# Patient Record
Sex: FEMALE | Race: BLACK OR AFRICAN AMERICAN | NOT HISPANIC OR LATINO
[De-identification: names, ages, dates, MRNs, and addresses within clinical notes are randomized per-mention and may not be internally consistent; named-entity substitution may affect disease eponyms.]

---

## 2019-03-22 ENCOUNTER — APPOINTMENT (OUTPATIENT)
Dept: OBGYN | Facility: CLINIC | Age: 49
End: 2019-03-22
Payer: COMMERCIAL

## 2019-03-22 VITALS
DIASTOLIC BLOOD PRESSURE: 80 MMHG | SYSTOLIC BLOOD PRESSURE: 130 MMHG | HEIGHT: 65 IN | WEIGHT: 229 LBS | BODY MASS INDEX: 38.15 KG/M2

## 2019-03-22 DIAGNOSIS — Z86.19 PERSONAL HISTORY OF OTHER INFECTIOUS AND PARASITIC DISEASES: ICD-10-CM

## 2019-03-22 DIAGNOSIS — Z87.59 PERSONAL HISTORY OF OTHER COMPLICATIONS OF PREGNANCY, CHILDBIRTH AND THE PUERPERIUM: ICD-10-CM

## 2019-03-22 DIAGNOSIS — Z82.49 FAMILY HISTORY OF ISCHEMIC HEART DISEASE AND OTHER DISEASES OF THE CIRCULATORY SYSTEM: ICD-10-CM

## 2019-03-22 DIAGNOSIS — Z81.8 FAMILY HISTORY OF OTHER MENTAL AND BEHAVIORAL DISORDERS: ICD-10-CM

## 2019-03-22 DIAGNOSIS — Z86.79 PERSONAL HISTORY OF OTHER DISEASES OF THE CIRCULATORY SYSTEM: ICD-10-CM

## 2019-03-22 PROCEDURE — 99201 OFFICE OUTPATIENT NEW 10 MINUTES: CPT

## 2019-03-22 PROCEDURE — 36415 COLL VENOUS BLD VENIPUNCTURE: CPT

## 2019-03-22 NOTE — HISTORY OF PRESENT ILLNESS
[Definite:  ___ (Date)] : the last menstrual period was [unfilled] [Normal Amount/Duration] : was of a normal amount and duration [Contraception] : uses contraception [IUD] : has an intrauterine device [Spotting Between  Menses] : no spotting between menses [Regular Cycle Intervals] : periods have been irregular [de-identified] : Paragard

## 2019-03-23 LAB — FSH SERPL-MCNC: 73.9 IU/L

## 2019-03-26 ENCOUNTER — APPOINTMENT (OUTPATIENT)
Dept: OBGYN | Facility: CLINIC | Age: 49
End: 2019-03-26
Payer: COMMERCIAL

## 2019-03-26 DIAGNOSIS — Z12.4 ENCOUNTER FOR SCREENING FOR MALIGNANT NEOPLASM OF CERVIX: ICD-10-CM

## 2019-03-26 DIAGNOSIS — Z30.432 ENCOUNTER FOR REMOVAL OF INTRAUTERINE CONTRACEPTIVE DEVICE: ICD-10-CM

## 2019-03-26 PROCEDURE — 99213 OFFICE O/P EST LOW 20 MIN: CPT | Mod: 25

## 2019-03-26 PROCEDURE — 58301 REMOVE INTRAUTERINE DEVICE: CPT

## 2019-03-26 NOTE — CHIEF COMPLAINT
[Follow Up] : follow up GYN visit [FreeTextEntry1] : Patient is here for an removal of a Paragard 10 year IUD and repeat Pap.

## 2019-03-30 LAB — CYTOLOGY CVX/VAG DOC THIN PREP: NORMAL

## 2021-03-23 ENCOUNTER — APPOINTMENT (OUTPATIENT)
Dept: OBGYN | Facility: CLINIC | Age: 51
End: 2021-03-23
Payer: COMMERCIAL

## 2021-03-23 VITALS
BODY MASS INDEX: 40.81 KG/M2 | DIASTOLIC BLOOD PRESSURE: 90 MMHG | SYSTOLIC BLOOD PRESSURE: 140 MMHG | WEIGHT: 245.25 LBS

## 2021-03-23 DIAGNOSIS — N95.1 MENOPAUSAL AND FEMALE CLIMACTERIC STATES: ICD-10-CM

## 2021-03-23 PROCEDURE — 99072 ADDL SUPL MATRL&STAF TM PHE: CPT

## 2021-03-23 PROCEDURE — 99396 PREV VISIT EST AGE 40-64: CPT

## 2021-03-24 PROBLEM — N95.1 MENOPAUSE SYNDROME: Status: ACTIVE | Noted: 2021-03-24

## 2021-03-24 NOTE — PLAN
[FreeTextEntry1] : Discussed treatment options including HRT and Brisdelle, patient declines. Interested in natural options. Encouraged weight loss, limiting caffeine and alcohol. All questions answered.

## 2021-03-25 LAB — HPV HIGH+LOW RISK DNA PNL CVX: NOT DETECTED

## 2021-03-26 LAB — CYTOLOGY CVX/VAG DOC THIN PREP: NORMAL

## 2021-07-12 ENCOUNTER — APPOINTMENT (OUTPATIENT)
Dept: BREAST CENTER | Facility: CLINIC | Age: 51
End: 2021-07-12
Payer: COMMERCIAL

## 2021-07-12 VITALS
SYSTOLIC BLOOD PRESSURE: 125 MMHG | HEART RATE: 80 BPM | HEIGHT: 65 IN | DIASTOLIC BLOOD PRESSURE: 82 MMHG | BODY MASS INDEX: 40.15 KG/M2 | WEIGHT: 241 LBS

## 2021-07-12 DIAGNOSIS — N64.52 NIPPLE DISCHARGE: ICD-10-CM

## 2021-07-12 DIAGNOSIS — N95.1 MENOPAUSAL AND FEMALE CLIMACTERIC STATES: ICD-10-CM

## 2021-07-12 PROCEDURE — 99243 OFF/OP CNSLTJ NEW/EST LOW 30: CPT

## 2021-07-12 RX ORDER — COPPER 313.4 MG/1
INTRAUTERINE DEVICE INTRAUTERINE
Refills: 0 | Status: DISCONTINUED | COMMUNITY
End: 2021-07-12

## 2022-06-10 ENCOUNTER — EMERGENCY (EMERGENCY)
Facility: HOSPITAL | Age: 52
LOS: 1 days | Discharge: HOME | End: 2022-06-10
Admitting: EMERGENCY MEDICINE
Payer: COMMERCIAL

## 2022-06-10 VITALS
DIASTOLIC BLOOD PRESSURE: 85 MMHG | TEMPERATURE: 98 F | RESPIRATION RATE: 16 BRPM | SYSTOLIC BLOOD PRESSURE: 127 MMHG | OXYGEN SATURATION: 98 % | HEART RATE: 72 BPM | HEIGHT: 65 IN | WEIGHT: 242.07 LBS

## 2022-06-10 VITALS
OXYGEN SATURATION: 97 % | SYSTOLIC BLOOD PRESSURE: 128 MMHG | HEART RATE: 82 BPM | DIASTOLIC BLOOD PRESSURE: 88 MMHG | TEMPERATURE: 98 F | RESPIRATION RATE: 18 BRPM

## 2022-06-10 DIAGNOSIS — R07.9 CHEST PAIN, UNSPECIFIED: ICD-10-CM

## 2022-06-10 DIAGNOSIS — Z20.822 CONTACT WITH AND (SUSPECTED) EXPOSURE TO COVID-19: ICD-10-CM

## 2022-06-10 DIAGNOSIS — H57.89 OTHER SPECIFIED DISORDERS OF EYE AND ADNEXA: ICD-10-CM

## 2022-06-10 LAB
ALBUMIN SERPL ELPH-MCNC: 3.2 G/DL — LOW (ref 3.4–5)
ALP SERPL-CCNC: 97 U/L — SIGNIFICANT CHANGE UP (ref 40–120)
ALT FLD-CCNC: 19 U/L — SIGNIFICANT CHANGE UP (ref 12–42)
ANION GAP SERPL CALC-SCNC: 8 MMOL/L — LOW (ref 9–16)
APPEARANCE UR: CLEAR — SIGNIFICANT CHANGE UP
APTT BLD: 32.3 SEC — SIGNIFICANT CHANGE UP (ref 27.5–35.5)
AST SERPL-CCNC: 17 U/L — SIGNIFICANT CHANGE UP (ref 15–37)
BASOPHILS # BLD AUTO: 0.02 K/UL — SIGNIFICANT CHANGE UP (ref 0–0.2)
BASOPHILS NFR BLD AUTO: 0.3 % — SIGNIFICANT CHANGE UP (ref 0–2)
BILIRUB SERPL-MCNC: 0.5 MG/DL — SIGNIFICANT CHANGE UP (ref 0.2–1.2)
BILIRUB UR-MCNC: NEGATIVE — SIGNIFICANT CHANGE UP
BUN SERPL-MCNC: 17 MG/DL — SIGNIFICANT CHANGE UP (ref 7–23)
CALCIUM SERPL-MCNC: 8.8 MG/DL — SIGNIFICANT CHANGE UP (ref 8.5–10.5)
CHLORIDE SERPL-SCNC: 107 MMOL/L — SIGNIFICANT CHANGE UP (ref 96–108)
CO2 SERPL-SCNC: 27 MMOL/L — SIGNIFICANT CHANGE UP (ref 22–31)
COLOR SPEC: YELLOW — SIGNIFICANT CHANGE UP
CREAT SERPL-MCNC: 0.84 MG/DL — SIGNIFICANT CHANGE UP (ref 0.5–1.3)
DIFF PNL FLD: NEGATIVE — SIGNIFICANT CHANGE UP
EGFR: 84 ML/MIN/1.73M2 — SIGNIFICANT CHANGE UP
EOSINOPHIL # BLD AUTO: 0.23 K/UL — SIGNIFICANT CHANGE UP (ref 0–0.5)
EOSINOPHIL NFR BLD AUTO: 3 % — SIGNIFICANT CHANGE UP (ref 0–6)
GLUCOSE SERPL-MCNC: 95 MG/DL — SIGNIFICANT CHANGE UP (ref 70–99)
GLUCOSE UR QL: NEGATIVE — SIGNIFICANT CHANGE UP
HCG SERPL-ACNC: 1 MIU/ML — SIGNIFICANT CHANGE UP
HCT VFR BLD CALC: 38.2 % — SIGNIFICANT CHANGE UP (ref 34.5–45)
HGB BLD-MCNC: 12.3 G/DL — SIGNIFICANT CHANGE UP (ref 11.5–15.5)
IMM GRANULOCYTES NFR BLD AUTO: 0.4 % — SIGNIFICANT CHANGE UP (ref 0–1.5)
INR BLD: 1.07 — SIGNIFICANT CHANGE UP (ref 0.88–1.16)
KETONES UR-MCNC: NEGATIVE — SIGNIFICANT CHANGE UP
LACTATE SERPL-SCNC: 1.2 MMOL/L — SIGNIFICANT CHANGE UP (ref 0.4–2)
LEUKOCYTE ESTERASE UR-ACNC: NEGATIVE — SIGNIFICANT CHANGE UP
LIDOCAIN IGE QN: 120 U/L — SIGNIFICANT CHANGE UP (ref 73–393)
LYMPHOCYTES # BLD AUTO: 2.6 K/UL — SIGNIFICANT CHANGE UP (ref 1–3.3)
LYMPHOCYTES # BLD AUTO: 33.9 % — SIGNIFICANT CHANGE UP (ref 13–44)
MAGNESIUM SERPL-MCNC: 2.1 MG/DL — SIGNIFICANT CHANGE UP (ref 1.6–2.6)
MCHC RBC-ENTMCNC: 26.9 PG — LOW (ref 27–34)
MCHC RBC-ENTMCNC: 32.2 GM/DL — SIGNIFICANT CHANGE UP (ref 32–36)
MCV RBC AUTO: 83.6 FL — SIGNIFICANT CHANGE UP (ref 80–100)
MONOCYTES # BLD AUTO: 0.73 K/UL — SIGNIFICANT CHANGE UP (ref 0–0.9)
MONOCYTES NFR BLD AUTO: 9.5 % — SIGNIFICANT CHANGE UP (ref 2–14)
NEUTROPHILS # BLD AUTO: 4.05 K/UL — SIGNIFICANT CHANGE UP (ref 1.8–7.4)
NEUTROPHILS NFR BLD AUTO: 52.9 % — SIGNIFICANT CHANGE UP (ref 43–77)
NITRITE UR-MCNC: NEGATIVE — SIGNIFICANT CHANGE UP
NRBC # BLD: 0 /100 WBCS — SIGNIFICANT CHANGE UP (ref 0–0)
NT-PROBNP SERPL-SCNC: 44 PG/ML — SIGNIFICANT CHANGE UP
PH UR: 7 — SIGNIFICANT CHANGE UP (ref 5–8)
PLATELET # BLD AUTO: 225 K/UL — SIGNIFICANT CHANGE UP (ref 150–400)
POTASSIUM SERPL-MCNC: 3.7 MMOL/L — SIGNIFICANT CHANGE UP (ref 3.5–5.3)
POTASSIUM SERPL-SCNC: 3.7 MMOL/L — SIGNIFICANT CHANGE UP (ref 3.5–5.3)
PROT SERPL-MCNC: 7 G/DL — SIGNIFICANT CHANGE UP (ref 6.4–8.2)
PROT UR-MCNC: NEGATIVE MG/DL — SIGNIFICANT CHANGE UP
PROTHROM AB SERPL-ACNC: 12.5 SEC — SIGNIFICANT CHANGE UP (ref 10.5–13.4)
RBC # BLD: 4.57 M/UL — SIGNIFICANT CHANGE UP (ref 3.8–5.2)
RBC # FLD: 13.2 % — SIGNIFICANT CHANGE UP (ref 10.3–14.5)
SARS-COV-2 RNA SPEC QL NAA+PROBE: SIGNIFICANT CHANGE UP
SODIUM SERPL-SCNC: 142 MMOL/L — SIGNIFICANT CHANGE UP (ref 132–145)
SP GR SPEC: 1.01 — SIGNIFICANT CHANGE UP (ref 1–1.03)
TROPONIN I, HIGH SENSITIVITY RESULT: 5.5 NG/L — SIGNIFICANT CHANGE UP
UROBILINOGEN FLD QL: 0.2 E.U./DL — SIGNIFICANT CHANGE UP
WBC # BLD: 7.66 K/UL — SIGNIFICANT CHANGE UP (ref 3.8–10.5)
WBC # FLD AUTO: 7.66 K/UL — SIGNIFICANT CHANGE UP (ref 3.8–10.5)

## 2022-06-10 PROCEDURE — 71045 X-RAY EXAM CHEST 1 VIEW: CPT | Mod: 26

## 2022-06-10 PROCEDURE — 99285 EMERGENCY DEPT VISIT HI MDM: CPT | Mod: 25

## 2022-06-10 PROCEDURE — 93010 ELECTROCARDIOGRAM REPORT: CPT

## 2022-06-10 RX ORDER — ERYTHROMYCIN BASE 5 MG/GRAM
1 OINTMENT (GRAM) OPHTHALMIC (EYE)
Qty: 1 | Refills: 0
Start: 2022-06-10 | End: 2022-06-16

## 2022-06-10 NOTE — ED PROVIDER NOTE - PATIENT PORTAL LINK FT
You can access the FollowMyHealth Patient Portal offered by [facility] by registering at the following website: [email or web address]. By joining [employer]’s FollowMyHealth portal, you will also be able to view your health information using other applications (apps) compatible with our system.

## 2022-06-10 NOTE — ED PROVIDER NOTE - NSFOLLOWUPINSTRUCTIONS_ED_ALL_ED_FT
Follow up with your primary care doctor or clinics listed below if you do not have a doctor  Return immediately for any new or worsening symptoms or any new concerns.  Chest Pain    Chest pain can be caused by many different conditions which may or may not be dangerous. Causes include heartburn, lung infections, heart attack, blood clot in lungs, skin infections, strain or damage to muscle, cartilage, or bones, etc. In addition to a history and physical examination, an electrocardiogram (ECG) or other lab tests may have been performed to determine the cause of your chest pain. Follow up with your primary care provider or with a cardiologist as instructed.     SEEK IMMEDIATE MEDICAL CARE IF YOU HAVE ANY OF THE FOLLOWING SYMPTOMS: worsening chest pain, coughing up blood, unexplained back/neck/jaw pain, severe abdominal pain, dizziness or lightheadedness, fainting, shortness of breath, sweaty or clammy skin, vomiting, or racing heart beat. These symptoms may represent a serious problem that is an emergency. Do not wait to see if the symptoms will go away. Get medical help right away. Call 911 and do not drive yourself to the hospital.

## 2022-06-10 NOTE — ED PROVIDER NOTE - CLINICAL SUMMARY MEDICAL DECISION MAKING FREE TEXT BOX
52 y/o female with no PMHx presenting with intermittent chest pain since yesterday and left eye injection with discharge. On examination there was left eye injection with some discharge. Will do ACS workup and treat for conjunctivitis.

## 2022-06-10 NOTE — ED PROVIDER NOTE - OBJECTIVE STATEMENT
50 y/o female with no PMHx, presenting with intermittent chest pain since yesterday. Patient also complains of left eye injection with some discharge. Patient is concerned for conjunctivitis. Pt denies fevers/chills, headache, SOB, abdominal pain, N/V/D, weakness, and numbness.

## 2022-06-10 NOTE — ED PROVIDER NOTE - CHPI ED SYMPTOMS NEG
no weakness, no numbness, no abdominal pain, no diarrhea, no headache/no fever/no nausea/no shortness of breath/no vomiting/no chills

## 2022-06-10 NOTE — ED ADULT NURSE NOTE - NSIMPLEMENTINTERV_GEN_ALL_ED
Implemented All Universal Safety Interventions:  [place] to call system. Call bell, personal items and telephone within reach. Instruct patient to call for assistance. Room bathroom lighting operational. Non-slip footwear when patient is off stretcher. Physically safe environment: no spills, clutter or unnecessary equipment. Stretcher in lowest position, wheels locked, appropriate side rails in place.

## 2022-06-10 NOTE — ED ADULT NURSE NOTE - OBJECTIVE STATEMENT
Pt presents to ED c/o intermittent chest pain x 2 days. On arrival pt denies any active chest pain, shortness of breath, dizziness or palpitations

## 2022-06-12 LAB
CULTURE RESULTS: SIGNIFICANT CHANGE UP
SPECIMEN SOURCE: SIGNIFICANT CHANGE UP

## 2022-07-14 ENCOUNTER — NON-APPOINTMENT (OUTPATIENT)
Age: 52
End: 2022-07-14

## 2022-07-14 ENCOUNTER — APPOINTMENT (OUTPATIENT)
Dept: BREAST CENTER | Facility: CLINIC | Age: 52
End: 2022-07-14

## 2022-07-14 VITALS
HEIGHT: 65 IN | DIASTOLIC BLOOD PRESSURE: 86 MMHG | HEART RATE: 91 BPM | WEIGHT: 253 LBS | SYSTOLIC BLOOD PRESSURE: 141 MMHG | BODY MASS INDEX: 42.15 KG/M2

## 2022-07-14 DIAGNOSIS — N64.4 MASTODYNIA: ICD-10-CM

## 2022-07-14 PROCEDURE — 99213 OFFICE O/P EST LOW 20 MIN: CPT

## 2022-07-15 PROBLEM — Z78.9 OTHER SPECIFIED HEALTH STATUS: Chronic | Status: ACTIVE | Noted: 2022-06-10

## 2022-11-16 ENCOUNTER — EMERGENCY (EMERGENCY)
Facility: HOSPITAL | Age: 52
LOS: 1 days | Discharge: HOME | End: 2022-11-16
Admitting: EMERGENCY MEDICINE
Payer: SELF-PAY

## 2022-11-16 VITALS
HEART RATE: 93 BPM | DIASTOLIC BLOOD PRESSURE: 82 MMHG | TEMPERATURE: 98 F | WEIGHT: 240.08 LBS | HEIGHT: 65 IN | OXYGEN SATURATION: 98 % | SYSTOLIC BLOOD PRESSURE: 140 MMHG | RESPIRATION RATE: 18 BRPM

## 2022-11-16 PROCEDURE — 99283 EMERGENCY DEPT VISIT LOW MDM: CPT

## 2022-11-16 RX ORDER — IBUPROFEN 200 MG
600 TABLET ORAL ONCE
Refills: 0 | Status: COMPLETED | OUTPATIENT
Start: 2022-11-16 | End: 2022-11-16

## 2022-11-16 RX ADMIN — Medication 600 MILLIGRAM(S): at 17:03

## 2022-11-16 NOTE — ED ADULT TRIAGE NOTE - CHIEF COMPLAINT QUOTE
Pt s/p meniscus tear repair Nov. 10th. States "Yesterday I noticed the stiches started to come loose". Denies fevers, chills

## 2022-11-16 NOTE — ED PROVIDER NOTE - NSFOLLOWUPINSTRUCTIONS_ED_ALL_ED_FT
Follow up with Dr. Pinto tomorrow in the [place] office. [phone] [address], [place] [address]    REturn to ED for severe pain, fever, chills, swelling, redness or drainage

## 2022-11-16 NOTE — ED PROVIDER NOTE - PATIENT PORTAL LINK FT
You can access the FollowMyHealth Patient Portal offered by [facility] by registering at the following website: [email or web address]. By joining [employer]’s FollowMyHealth portal, you will also be able to view your health information using other applications (apps) compatible with our system.

## 2022-11-16 NOTE — ED PROVIDER NOTE - CLINICAL SUMMARY MEDICAL DECISION MAKING FREE TEXT BOX
50 yo F with no pmh c/o problems with her sutures to her R knee. Pt had a meniscus surgery on 11/10 in [place]. Yesterday she noticed the suture was stuck to the bandage and thought maybe it got pulled out. Reports  her knee feels warm. Denies fever, chills, numbness, tingling, redness, worsening swelling. Pt taking motrin for pain which helps. Pt tried to call her surgeon today and then nurse told her to go to the ED.  Afebrile. R knee- mild warmth, no erythema, mild swelling, sutures in place, no drainage. Spoke with pts surgeon Dr. Pinto who states it is not uncommon for mild warmth post op and would not recommend abx without testing the fluid. He can see her in his [place] office tomorrow.

## 2022-11-16 NOTE — ED PROVIDER NOTE - OBJECTIVE STATEMENT
50 yo F with no pmh c/o problems with her sutures to her R knee. Pt had a meniscus surgery on 11/10 in [place]. Yesterday she noticed the suture was stuck to the bandage and thought maybe it got pulled out. Reports  her knee feels warm. Denies fever, chills, numbness, tingling, redness, worsening swelling. Pt taking motrin for pain which helps. Pt tried to call her surgeon today and then nurse told her to go to the ED.

## 2022-11-16 NOTE — ED ADULT NURSE NOTE - CAS TRG GENERAL NORM CIRC DET
PATIENT REFILL PROTOCOL FOR THE FOLLOWING:  MEDICATION: Adderall  QUANTITY: 60  PATIENT SIG: Take one capsule by mouth twice daily.    APPTS AND LABS ARE UP-TO-DATE  LAST OFFICE VISIT: 10/23/19  FOLLOW UP APPT: None  LAST LAB: None    PROCESS SCRIPT FOR:  LAST REFILL DATE: 4/1/2020  PHARMACY NOTED AND UPDATED: Yes    Medication cannot be refilled: PDMP review: Criteria met. Forwarded to Physician/GAGANDEEP for signature.            Strong peripheral pulses

## 2022-11-16 NOTE — ED ADULT NURSE NOTE - CINV DISCH TEACH PARTICIP
Post-Care Instructions: I reviewed with the patient in detail post-care instructions. Patient is to keep the treatment areaas dry overnight, and then apply bacitracin twice daily until healed. Patient may apply hydrogen peroxide soaks to remove any crusting. Render Post-Care Instructions In Note?: no Prep Text (Optional): Prior to removal the treatment areas were prepped in the usual fashion. Consent was obtained and risks were reviewed including but not limited to scarring, infection, bleeding, scabbing, incomplete removal, and allergy to anesthesia. Acne Type: Comedonal Lesions Detail Level: Detailed Patient

## 2022-11-16 NOTE — ED PROVIDER NOTE - PHYSICAL EXAMINATION
CONSTITUTIONAL: Well-appearing;  in no apparent distress.   HEAD: Normocephalic; atraumatic.   EYES: PERRL; EOM intact; conjunctiva and sclera clear  ENT: normal nose; no rhinorrhea; normal pharynx with no erythema or lesions.   NECK: Supple; non-tender;   CARDIOVASCULAR: rrr,  RESPIRATORY: Breathing easily;   MSK: R knee- mild warmth, no erythema, mild swelling, sutures in place, no drainage

## 2022-11-18 DIAGNOSIS — M25.561 PAIN IN RIGHT KNEE: ICD-10-CM

## 2022-11-18 DIAGNOSIS — Z87.39 PERSONAL HISTORY OF OTHER DISEASES OF THE MUSCULOSKELETAL SYSTEM AND CONNECTIVE TISSUE: ICD-10-CM

## 2022-11-18 DIAGNOSIS — M25.461 EFFUSION, RIGHT KNEE: ICD-10-CM

## 2023-04-05 ENCOUNTER — APPOINTMENT (OUTPATIENT)
Dept: OBGYN | Facility: CLINIC | Age: 53
End: 2023-04-05

## 2023-06-07 ENCOUNTER — APPOINTMENT (OUTPATIENT)
Dept: OBGYN | Facility: CLINIC | Age: 53
End: 2023-06-07
Payer: COMMERCIAL

## 2023-06-07 VITALS
HEART RATE: 85 BPM | BODY MASS INDEX: 39.92 KG/M2 | OXYGEN SATURATION: 100 % | SYSTOLIC BLOOD PRESSURE: 124 MMHG | DIASTOLIC BLOOD PRESSURE: 77 MMHG | WEIGHT: 239.88 LBS

## 2023-06-07 DIAGNOSIS — N89.8 OTHER SPECIFIED NONINFLAMMATORY DISORDERS OF VAGINA: ICD-10-CM

## 2023-06-07 DIAGNOSIS — Z01.419 ENCOUNTER FOR GYNECOLOGICAL EXAMINATION (GENERAL) (ROUTINE) W/OUT ABNORMAL FINDINGS: ICD-10-CM

## 2023-06-07 PROCEDURE — 99396 PREV VISIT EST AGE 40-64: CPT

## 2023-06-07 NOTE — PHYSICAL EXAM
[Chaperone Present] : A chaperone was present in the examining room during all aspects of the physical examination [Appropriately responsive] : appropriately responsive [Alert] : alert [No Acute Distress] : no acute distress [No Lymphadenopathy] : no lymphadenopathy [Soft] : soft [Non-tender] : non-tender [Non-distended] : non-distended [No HSM] : No HSM [No Lesions] : no lesions [No Mass] : no mass [Oriented x3] : oriented x3 [Examination Of The Breasts] : a normal appearance [No Masses] : no breast masses were palpable [Labia Majora] : normal [Labia Minora] : normal [Normal] : normal [Uterine Adnexae] : non-palpable [FreeTextEntry8] : without pain or tenderness

## 2023-06-07 NOTE — HISTORY OF PRESENT ILLNESS
[Patient reported mammogram was normal] : Patient reported mammogram was normal [Patient reported PAP Smear was normal] : Patient reported PAP Smear was normal [Currently In Menopause] : currently in menopause [Post-Menopause, No Sxs] : post-menopausal, currently without symptoms [Currently Active] : currently active [Men] : men [No] : No [postmenopausal] : postmenopausal [Menopause Age: ____] : age at menopause was [unfilled] [Y] : Positive pregnancy history [Mammogramdate] : 7/14/2022 [PapSmeardate] : 3/23/2021 [TextBox_31] : HPV not detected [FreeTextEntry1] : 11/30/2019

## 2023-06-08 LAB — HPV HIGH+LOW RISK DNA PNL CVX: NOT DETECTED

## 2023-06-09 LAB
CANDIDA VAG CYTO: NOT DETECTED
G VAGINALIS+PREV SP MTYP VAG QL MICRO: NOT DETECTED
T VAGINALIS VAG QL WET PREP: NOT DETECTED

## 2023-06-11 ENCOUNTER — NON-APPOINTMENT (OUTPATIENT)
Age: 53
End: 2023-06-11

## 2023-06-14 LAB — CYTOLOGY CVX/VAG DOC THIN PREP: NORMAL

## 2023-07-19 ENCOUNTER — APPOINTMENT (OUTPATIENT)
Dept: BREAST CENTER | Facility: CLINIC | Age: 53
End: 2023-07-19
Payer: COMMERCIAL

## 2023-07-19 ENCOUNTER — NON-APPOINTMENT (OUTPATIENT)
Age: 53
End: 2023-07-19

## 2023-07-19 VITALS
DIASTOLIC BLOOD PRESSURE: 83 MMHG | SYSTOLIC BLOOD PRESSURE: 136 MMHG | HEART RATE: 79 BPM | WEIGHT: 238 LBS | BODY MASS INDEX: 39.65 KG/M2 | HEIGHT: 65 IN

## 2023-07-19 DIAGNOSIS — Z78.9 OTHER SPECIFIED HEALTH STATUS: ICD-10-CM

## 2023-07-19 DIAGNOSIS — Z00.00 ENCOUNTER FOR GENERAL ADULT MEDICAL EXAMINATION W/OUT ABNORMAL FINDINGS: ICD-10-CM

## 2023-07-19 DIAGNOSIS — Z12.39 ENCOUNTER FOR OTHER SCREENING FOR MALIGNANT NEOPLASM OF BREAST: ICD-10-CM

## 2023-07-19 PROCEDURE — 99213 OFFICE O/P EST LOW 20 MIN: CPT

## 2023-07-19 RX ORDER — DICLOFENAC POTASSIUM 50 MG/1
TABLET, COATED ORAL
Refills: 0 | Status: ACTIVE | COMMUNITY

## 2023-07-19 RX ORDER — SEMAGLUTIDE 0.68 MG/ML
2 INJECTION, SOLUTION SUBCUTANEOUS
Refills: 0 | Status: ACTIVE | COMMUNITY

## 2023-07-19 NOTE — PHYSICAL EXAM
[de-identified] : Bilateral breast/axilla/supraclavicular area: No masses, discharge, or adenopathy\par \par

## 2023-07-19 NOTE — REVIEW OF SYSTEMS
[Fever] : no fever [Chills] : no chills [Shortness Of Breath] : no shortness of breath [Cough] : no cough [Skin Lesions] : no skin lesions [Skin Wound] : no skin wound [Swollen Glands] : no swollen glands [Swollen Glands In The Neck] : no swollen glands in the neck

## 2023-07-19 NOTE — HISTORY OF PRESENT ILLNESS
[FreeTextEntry1] : Patient is a 53 yo F here for breast cancer screening. Previously seen for L breast pain. Denies family history of breast or ovarian cancer. Patient denies palpable masses, skin changes, or nipple discharge bilaterally.\par \par 8/26/20: B/L MG- FIbroglandular. BIRADS 1. \par 7/12/21: L MG & US- stable nodule, BIRADS 2\par 7/14/22 B/l MG- scattered fibroglandular. L stable subcentimeter LN UOQ. BI-RADS 2.\par 7/19/23: B/l MG- scattered fibroglandular, R stable 1.4 cm mass, R stable 12:00 0.7 cm, ALISSA. BIRADS 2.

## 2023-07-19 NOTE — PAST MEDICAL HISTORY
[History of Hormone Replacement Treatment] : has no history of hormone replacement treatment [FreeTextEntry6] : No [FreeTextEntry7] : Yes [FreeTextEntry8] : Yes

## 2024-04-04 ENCOUNTER — OUTPATIENT (OUTPATIENT)
Dept: OUTPATIENT SERVICES | Facility: HOSPITAL | Age: 54
LOS: 1 days | End: 2024-04-04
Payer: COMMERCIAL

## 2024-04-04 ENCOUNTER — APPOINTMENT (OUTPATIENT)
Dept: CT IMAGING | Facility: HOSPITAL | Age: 54
End: 2024-04-04

## 2024-04-04 PROCEDURE — 75571 CT HRT W/O DYE W/CA TEST: CPT

## 2024-04-04 PROCEDURE — 75571 CT HRT W/O DYE W/CA TEST: CPT | Mod: 26

## 2024-05-19 ENCOUNTER — EMERGENCY (EMERGENCY)
Facility: HOSPITAL | Age: 54
LOS: 1 days | Discharge: HOME | End: 2024-05-19
Attending: EMERGENCY MEDICINE | Admitting: EMERGENCY MEDICINE
Payer: MEDICAID

## 2024-05-19 VITALS
DIASTOLIC BLOOD PRESSURE: 83 MMHG | SYSTOLIC BLOOD PRESSURE: 141 MMHG | HEART RATE: 82 BPM | OXYGEN SATURATION: 98 % | RESPIRATION RATE: 18 BRPM | TEMPERATURE: 97 F
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
## 2024-05-19 DIAGNOSIS — S89.81XA OTHER SPECIFIED INJURIES OF RIGHT LOWER LEG, INITIAL ENCOUNTER: ICD-10-CM
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
## 2024-05-19 PROCEDURE — 99284 EMERGENCY DEPT VISIT MOD MDM: CPT

## 2024-05-19 RX ORDER — IBUPROFEN 200 MG
600 TABLET ORAL ONCE
Refills: 0 | Status: COMPLETED | OUTPATIENT
Start: 2024-05-19 | End: 2024-05-19

## 2024-05-19 RX ADMIN — Medication 600 MILLIGRAM(S): at 23:58

## 2024-05-19 NOTE — ED PROVIDER NOTE - NSICDXNOPASTMEDICALHX_GEN_ALL_ED
<-- Click to add NO pertinent Past Medical History Refer to the Assessment tab to view/cancel completed assessment.

## 2024-05-19 NOTE — ED PROVIDER NOTE - PROGRESS NOTE DETAILS
The patient's x-rays are within normal limits.  She is unable knee immobilizer and has a cane does not want a pain prescription.  Will discharge with nos for ortho fu.

## 2024-05-19 NOTE — ED PROVIDER NOTE - NSFOLLOWUPINSTRUCTIONS_ED_ALL_ED_FT
PLEASE FOLLOW-UP WITH ONE OF OUR ORTHOPEDIC DOCTORS.  SEE ABOVE FOR REFERRAL.  THEIR OFFICE SHOULD REACH OUT TO YOU TOMORROW TO HELP YOU SET UP A FOLLOW-UP APPOINTMENT.  PLEASE USE THE CANE AND KNEE IMMOBILIZER UNTIL YOU SEE THE ORTHOPEDIC DOCTOR.     -TAKE OVER THE COUNTER IBUPROFEN 400-600MG BY MOUTH EVERY 8 HOURS AS NEEDED FOR PAIN.  BE SURE TO TAKE WITH FOOD OR MILK AS THIS MEDICATION CAN CAUSE STOMACH IRRITATION.    -PLEASE RETURN TO THE ER IMMEDIATELY OR CALL 911 FOR ANY HIGH FEVER, TROUBLE BREATHING, VOMITING, SEVERE PAIN, OR ANY OTHER CONCERNS.
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
## 2024-05-19 NOTE — ED PROVIDER NOTE - CARE PROVIDER_API CALL
Jun Ashby  Orthopaedic Surgery  [address]  [place] [address]  Phone: [phone]  Fax: [phone]  Follow Up Time: 1-3 Days

## 2024-05-19 NOTE — ED ADULT NURSE NOTE - NSFALLUNIVINTERV_ED_ALL_ED
Bed/Stretcher in lowest position, wheels locked, appropriate side rails in place/Call bell, personal items and telephone in reach/Instruct patient to call for assistance before getting out of bed/chair/stretcher/Non-slip footwear applied when patient is off stretcher/[place] to call system/Physically safe environment - no spills, clutter or unnecessary equipment/Purposeful proactive rounding/Room/bathroom lighting operational, light cord in reach

## 2024-05-19 NOTE — ED PROVIDER NOTE - PHYSICAL EXAMINATION
VITAL SIGNS: I have reviewed nursing notes and confirm.  CONST: Well-developed; well-nourished; No apparent distress.  ENT: No nasal discharge; airway clear.  EYES: Sclera clear. Pupils round and symmetrical bilaterally.  RESP: Breathing comfortably; speaking in full sentences.   MSK: Swelling noted to right knee joint with tenderness to palpation over lateral aspect of joint.  Patellar tendon is intact.  Neurovascularly intact distally.  Achilles tendon is intact.  NEURO: Alert, oriented. Speech is fluent and appropriate. Moving all extremities appropriately. No gross motor or sensory abnormalities.  SKIN: Skin is normal temperature; no diaphoresis; no pallor.  PSYCH: Cooperative. Appropriate mood, language, and behavior.
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
## 2024-05-19 NOTE — ED PROVIDER NOTE - PATIENT PORTAL LINK FT
You can access the FollowMyHealth Patient Portal offered by [facility] by registering at the following website: [email or web address]. By joining [employer]’s FollowMyHealth portal, you will also be able to view your health information using other applications (apps) compatible with our system.

## 2024-05-19 NOTE — ED PROVIDER NOTE - CLINICAL SUMMARY MEDICAL DECISION MAKING FREE TEXT BOX
Patient sustained a twisting injury to the right knee approximately 10 days ago.  History and exam is concerning for ligamentous injury.  We will perform x-ray here to rule out bony involvement.  We will place in knee immobilizer and give cane to aid in ambulation.  We will ensure prompt follow-up with orthopedics.  I have emailed the Ortho fast track account so they can call patient tomorrow to obtain a follow-up appointment.  We discussed this plan with the patient and she demonstrates full understanding.  She demonstrates understanding that she may need further testing like an MRI of the knee.  Emergent return precautions discussed.

## 2024-05-20 ENCOUNTER — EMERGENCY (EMERGENCY)
Facility: HOSPITAL | Age: 54
LOS: 1 days | Discharge: HOME | End: 2024-05-20
Attending: EMERGENCY MEDICINE | Admitting: EMERGENCY MEDICINE
Payer: MEDICAID

## 2024-05-20 ENCOUNTER — NON-APPOINTMENT (OUTPATIENT)
Age: 54
End: 2024-05-20

## 2024-05-20 VITALS
OXYGEN SATURATION: 96 % | SYSTOLIC BLOOD PRESSURE: 134 MMHG | HEART RATE: 79 BPM | TEMPERATURE: 98 F | RESPIRATION RATE: 16 BRPM | DIASTOLIC BLOOD PRESSURE: 81 MMHG

## 2024-05-20 VITALS
DIASTOLIC BLOOD PRESSURE: 81 MMHG | HEART RATE: 71 BPM | TEMPERATURE: 98 F | OXYGEN SATURATION: 98 % | SYSTOLIC BLOOD PRESSURE: 129 MMHG | RESPIRATION RATE: 18 BRPM

## 2024-05-20 PROCEDURE — 73700 CT LOWER EXTREMITY W/O DYE: CPT | Mod: 26,RT,MC

## 2024-05-20 PROCEDURE — 99284 EMERGENCY DEPT VISIT MOD MDM: CPT

## 2024-05-20 PROCEDURE — 73562 X-RAY EXAM OF KNEE 3: CPT | Mod: 26,RT

## 2024-05-20 RX ORDER — IBUPROFEN 200 MG
1 TABLET ORAL
Qty: 20 | Refills: 0
Start: 2024-05-20

## 2024-05-20 NOTE — ED ADULT NURSE NOTE - NSFALLUNIVINTERV_ED_ALL_ED
Bed/Stretcher in lowest position, wheels locked, appropriate side rails in place/Call bell, personal items and telephone in reach/Instruct patient to call for assistance before getting out of bed/chair/stretcher/Non-slip footwear applied when patient is off stretcher/[place] to call system/Physically safe environment - no spills, clutter or unnecessary equipment/Purposeful proactive rounding/Room/bathroom lighting operational, light cord in reach

## 2024-05-20 NOTE — ED PROVIDER NOTE - PHYSICAL EXAMINATION
VITAL SIGNS: I have reviewed nursing notes and confirm.  CONSTITUTIONAL: Well-developed; well-nourished; in no acute distress.  SKIN: Skin exam is warm and dry, no acute rash.  HEAD: Normocephalic; atraumatic.  EYES: conjunctiva and sclera clear.  ENT: No nasal discharge; airway clear.  NECK: Supple  CARD: RRR  RESP: Unlabored.    EXT: + R knee mild effusion w/pain elicited on ROM w/reduced ROM 2/2 pain, + posterior TTP, all other ext exam wnl  NEURO: Alert, oriented. Grossly unremarkable.  PSYCH: Cooperative, appropriate.

## 2024-05-20 NOTE — ED PROVIDER NOTE - OBJECTIVE STATEMENT
53-year-old female returns emergency department today after being seen yesterday for knee knee injury with an x-ray suspicious for possible tibial plateau fracture, recommending CT scan.  Patient taking Motrin for pain control with good effect.  Utilizing a cane for ambulation.  Patient was outfitted with a knee immobilizer yesterday but had difficulty putting it on today so arrives without it.  Has an appointment scheduled with orthopedics for tomorrow morning.

## 2024-05-20 NOTE — ED PROVIDER NOTE - CLINICAL SUMMARY MEDICAL DECISION MAKING FREE TEXT BOX
Offered pain control but patient declined.  CT positive for tibial plateau fracture.  Given crutches, told to be nonweightbearing until she is cleared by orthopedics with whom she will follow-up tomorrow morning.

## 2024-05-20 NOTE — ED PROVIDER NOTE - PATIENT PORTAL LINK FT
You can access the FollowMyHealth Patient Portal offered by [facility] by registering at the following website: [email or web address]. By joining [employer]’s FollowMyHealth portal, you will also be able to view your health information using other applications (apps) compatible with our system.

## 2024-05-20 NOTE — ED ADULT TRIAGE NOTE - CHIEF COMPLAINT QUOTE
Pt was seen in ED last night for right knee pain [place], pt received call today to come back in for CT. Ambulatory with personal cane.

## 2024-05-20 NOTE — ED ADULT NURSE NOTE - ED STAT RN HAND OFF
Handoff Hydroxychloroquine Pregnancy And Lactation Text: This medication has been shown to cause fetal harm but it isn't assigned a Pregnancy Risk Category. There are small amounts excreted in breast milk.

## 2024-05-20 NOTE — ED PROVIDER NOTE - NSFOLLOWUPINSTRUCTIONS_ED_ALL_ED_FT
A tibial plateau fracture is a break in the top of the shin bone (tibia). The top of the tibia has a flat, smooth surface (tibial plateau). This part of the tibia is softer than the rest of the bone. It forms the bottom of the knee joint. If a strong force pushes the thigh bone (femur) down onto the tibial plateau, the tibial plateau can collapse or break apart at the edges. This may also be called an intra-articular fracture.    A nondisplaced fracture means that the broken pieces of bone have not moved out of their normal position. This type of fracture can usually be treated without surgery.    What are the causes?  Common causes of this type of fracture include:  Car accidents.  Jumps or falls from a significant height.  Injuries from activities that put a lot of force on the knee, such as injuries from skiing, mountain biking, or contact sports.  What increases the risk?  You may be at higher risk for this type of fracture if:  You play sports that put a lot of force on your knee, including contact sports.  You have a history of bone infections.  You are an older person with a condition that causes weak bones (osteoporosis).  What are the signs or symptoms?  Symptoms of a nondisplaced tibial plateau fracture may include:  Pain that gets worse when putting weight on your knee or moving your knee.  Knee swelling and bruising.  The knee having an abnormal shape (deformity).  How is this diagnosed?  This condition may be diagnosed based on:  Your symptoms and medical history. Your health care provider may ask about recent knee or leg injuries you have had.  A physical exam.  Imaging tests, such as X-rays, a CT scan, or an MRI.  How is this treated?  This condition is treated by wearing a brace on your leg. You will not be able to put any body weight on the leg (weight-bearing restrictions). You may be given crutches, a scooter, a walker, or a wheelchair to help you move around. Treatment may also involve:  Prescription pain medicine.  Physical therapy.  Follow these instructions at home:  Medicines    Take over-the-counter and prescription medicines only as told by your health care provider.  Ask your health care provider if the medicine prescribed to you:  Requires you to avoid driving or using machinery.  Can cause constipation. You may need to take these actions to prevent or treat constipation:  Drink enough fluid to keep your urine pale yellow.  Take over-the-counter or prescription medicines.  Eat foods that are high in fiber, such as beans, whole grains, and fresh fruits and vegetables.  Limit foods that are high in fat and processed sugars, such as fried or sweet foods.  If you have a splint or brace:    Wear the splint or brace as told by your health care provider. Remove it only as told by your health care provider.  Loosen the splint or brace if your toes tingle, become numb, or turn cold and blue.  Keep the splint or brace clean and dry.  If you have a cast:    Do not put pressure on any part of the cast until it is fully hardened. This may take several hours.  Do not stick anything inside the cast to scratch your skin. Doing that increases your risk of infection.  Check the skin around the cast every day. Tell your health care provider about any concerns.  You may put lotion on dry skin around the edges of the cast. Do not put lotion on the skin underneath the cast.  Keep the cast clean and dry.  Activity    Do not use the injured limb to support your body weight until your health care provider says that you can. Use crutches, a cane, or a walker as told by your health care provider.  Return to your normal activities as told by your health care provider. Ask your health care provider what activities are safe for you.  Do exercises as told by your health care provider.  Ask your health care provider when it is safe to drive if you have a splint, brace, or a cast on your leg.  Managing pain, stiffness, and swelling      If directed, put ice on the injured area. To do this:  If you have a removable splint or brace, remove it as told by your health care provider.  Put ice in a plastic bag.  Place a towel between your skin and the bag or between your splint or cast and the bag.  Leave the ice on for 20 minutes, 2–3 times a day.  Remove the ice if your skin turns bright red. This is very important. If you cannot feel pain, heat, or cold, you have a greater risk of damage to the area.  Move your toes and ankle often to reduce stiffness and swelling.  Raise (elevate) the injured area above the level of your heart while you are sitting or lying down.  General instructions    Do not take baths, swim, or use a hot tub until your health care provider approves. Ask your health care provider if you may take showers. You may only be allowed to take sponge baths.  Do not use any products that contain nicotine or tobacco, such as cigarettes, e-cigarettes, and chewing tobacco. These can delay bone healing. If you need help quitting, ask your health care provider.  Keep all follow-up visits. This is important.  Contact a health care provider if you:  Have pain that does not get better with medicine.  Get help right away if:  You have severe pain or swelling.  You have new pain, swelling, or warmth in your lower leg.  Your toes or foot:  Are unusually cold.  Turn a bluish color.  Are numb.  You have chest pain.  You have difficulty breathing.  Summary  A tibial plateau fracture is a break in the top of the shin bone (tibia), which forms the bottom of the knee joint.  A nondisplaced fracture means that the broken pieces of bone have not moved out of their normal position.  Do not use your leg to support your body weight until your health care provider says that you can. Follow weight-bearing restrictions.  Keep all follow-up visits. This is important.

## 2024-05-21 ENCOUNTER — APPOINTMENT (OUTPATIENT)
Dept: ORTHOPEDIC SURGERY | Facility: CLINIC | Age: 54
End: 2024-05-21

## 2024-05-21 VITALS
DIASTOLIC BLOOD PRESSURE: 78 MMHG | OXYGEN SATURATION: 98 % | HEIGHT: 65 IN | BODY MASS INDEX: 34.16 KG/M2 | WEIGHT: 205 LBS | HEART RATE: 80 BPM | SYSTOLIC BLOOD PRESSURE: 116 MMHG

## 2024-05-21 DIAGNOSIS — M84.469A PATHOLOGICAL FRACTURE, UNSPECIFIED TIBIA AND FIBULA, INITIAL ENCOUNTER FOR FRACTURE: ICD-10-CM

## 2024-05-21 PROCEDURE — 20611 DRAIN/INJ JOINT/BURSA W/US: CPT | Mod: RT

## 2024-05-21 PROCEDURE — 99204 OFFICE O/P NEW MOD 45 MIN: CPT | Mod: 25

## 2024-05-21 RX ORDER — CYCLOBENZAPRINE HYDROCHLORIDE 10 MG/1
10 TABLET, FILM COATED ORAL
Qty: 30 | Refills: 0 | Status: ACTIVE | COMMUNITY
Start: 2024-05-21 | End: 1900-01-01

## 2024-05-21 RX ORDER — HYALURONATE SODIUM 20 MG/2 ML
20 SYRINGE (ML) INTRAARTICULAR
Qty: 2 | Refills: 0 | Status: ACTIVE | COMMUNITY
Start: 2024-05-21

## 2024-05-22 NOTE — CONSULT LETTER
[Dear  ___] : Dear  [unfilled], [Consult Letter:] : I had the pleasure of evaluating your patient, [unfilled]. [Please see my note below.] : Please see my note below. [Consult Closing:] : Thank you very much for allowing me to participate in the care of this patient.  If you have any questions, please do not hesitate to contact me. [Sincerely,] : Sincerely, [FreeTextEntry3] : Trinh Epperson MD

## 2024-05-22 NOTE — DISCUSSION/SUMMARY

## 2024-05-22 NOTE — ASSESSMENT
[FreeTextEntry1] : FELISA VIERA is a 53 year old female  with right knee pain. I discussed with the patient that their symptoms, signs, and imaging are most consistent with osteoarthritis and tibial insufficiency fracture. We reviewed the natural history of this condition and treatment options ranging from conservative measures (activity modification, physical therapy, icing, oral anti-inflammatory and/or analgesic medications, steroid injection, HA gel injections, PRP injections) to surgical management (TKA). Narrowing of the joint spaces confirmed proof of joint laxity as is seen on physical exam and x-rays.  A brace is required to support the instability and pushed the knee into a corrected position. We agreed on the following plan:   XR and CT reviewed with patient today. US guided aspiration as detailed above. ACE wrap applied. Activity modification: low impact aerobic activity (stationary bike, swimming, aqua aerobics) Recommend 150 min per week of moderate intensity aerobic activity Start Home Exercises for knee conditioning. Demonstration, resistance bands and handout provided. ROM exercises demonstrated for knee extension (pillow under heel with assistance of gravity) and heel to buttocks for flexion. Physical therapy. Referral provided. Medication: Meloxicam as needed and cyclobenzaprine as needed prescription provided. Advanced imaging: consider MRI if no symptomatic improvement Referral for lateral  knee brace provided today. Will place order for HA gel injection Patient will be notified once authorized to schedule appointment

## 2024-05-22 NOTE — PROCEDURE
[de-identified] :  Ultrasound guided aspiration and intra-articular steroid injection of right knee:  Following a discussion of the risks (bleeding, infection) and benefits, verbal consent was obtained. Patient placed in supine position. The suprapatellar recess and surrounding structures (patella, femur, quadriceps tendon, suprapatellar fat pad and prefemoral fat pad) were visualized in SAX and LAX with Sonosite 15 Hz linear transducer.   Superolateral knee was anaesthetised with ethyl chloride spray. Under strict sterile technique the right knee was prepped with chlorhexadine. Using ultrasound guidance (superolateral approach) a 25 G 1.5 inch needle was inserted and after negative aspiration, 3mL of 0.5% Bupivacaine and 1 mL of 1 % lidocaine was injected subcutaneously and along track into the suprapatellar recess. After adequate anaesthesia, an 18 G 1.5 inch needle was inserted into the suprapatellar recess and 30 mL of clear, serous fluid was aspirated.  The use of direct ultrasound visualization was necessary to increase patient safety by identifying and avoiding inadvertent needle placement within the neurovascular and osteochondral structures. Additionally, the increased accuracy of needle placement may improve therapeutic efficacy and allow higher diagnostic specificity when evaluating the effectiveness of this injection.  The patient tolerated the procedure well. Post-injection instructions given (no strenuous activity for 48 hours, ice, elevate). Patient verbalized understanding.
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
## 2024-05-22 NOTE — PHYSICAL EXAM
[de-identified] : General: Well-nourished, well-developed, alert, and in no acute distress. Head: Normocephalic. Eyes: Pupils equal, extraocular muscles intact, normal sclera. Nose: No nasal discharge. Cardiovascular: Extremities are warm and well perfused. Distal pulses are symmetric bilaterally. Respiratory: No labored breathing. Extremities: Sensation is intact distally bilaterally. Distal pulses are symmetric bilaterally Lymphatic: No regional lymphadenopathy, no lymphedema Neurologic: No focal deficits Skin: Normal skin color, texture, and turgor Psychiatric: Normal affect   MSK: Examination of [right] knee:   Gait antalgic Alignment: genu valgum  Effusion: moderate Arthroscopy incision scars well-healed No erythema, hematoma   Tender to palpation: medial joint line, lateral joint line, popliteal fossa Nontender to palpation: medial patellar facet, lateral patellar facet, quad tendon, patellar tendon, pes, Gerdy's tubercle, tibial tuberosity, hamstrings, ITB No warmth No Baker's cyst palpable ROM: 10-70 [No] patellar crepitus   Log roll negative Lachman negative Anterior drawer negative Posterior drawer negative Varus/valgus stress negative at 0 and 30 deg Extensor mechanism intact   Examination of [left] knee:  Old laceration and arthroscopic incision scars well-healed  No effusion, erythema, hematoma  Nontender to palpation: medial joint line, lateral joint line, medial patellar facet, lateral patellar facet, quad tendon, patellar tendon, pes, Gerdy's tubercle, tibial tuberosity, popliteal fossa, hamstrings, ITB No warmth No Baker's cyst palpable ROM: 0-120 Mild patellar crepitus   Log roll negative Lachman negative Anterior drawer negative Posterior drawer negative Varus/valgus stress negative at 0 and 30 deg Pramod negative Extensor mechanism intact   Sensation is intact to light touch over the superficial and deep peroneal nerve distributions and the posterior tibial nerve distribution. Capillary refill is less than two seconds. Posterior tibial and dorsalis pedis pulses 2+ equal bilaterally. No calf swelling or tenderness bilaterally. Strength testing shows hip flexion 5/5, hip adduction 5/5, hip abduction 5/5, knee extension 5/5, knee flexion 5/5, dorsiflexion 5/5, plantar flexion 5/5, EHL 5/5 Reflexes: Patellar 2+, Achilles 2+ [de-identified] : XR right knee (5/19/24): Curvilinear lucency at the inner aspect of the lateral tibial plateau concerning for fracture on AP view. No dislocation. Tricompartmental osteoarthritis greatest at the medial compartment. Small knee joint effusion.   CT right knee (5/20/24): Acute mildly impacted fracture of the lateral tibial plateau. There are no other acute fractures. There is a moderate-sized joint effusion. There is osteoarthritis which is most pronounced in the medial compartment where there is joint space narrowing with subchondral cystic change and sclerosis. There are tricompartmental osteophytes.

## 2024-05-24 ENCOUNTER — APPOINTMENT (OUTPATIENT)
Dept: ORTHOPEDIC SURGERY | Facility: CLINIC | Age: 54
End: 2024-05-24

## 2024-05-24 DIAGNOSIS — S82.121A DISPLACED FRACTURE OF LATERAL CONDYLE OF RIGHT TIBIA, INITIAL ENCOUNTER FOR CLOSED FRACTURE: ICD-10-CM
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
## 2024-06-14 ENCOUNTER — APPOINTMENT (OUTPATIENT)
Dept: ORTHOPEDIC SURGERY | Facility: CLINIC | Age: 54
End: 2024-06-14
Payer: MEDICAID

## 2024-06-14 VITALS
HEIGHT: 65 IN | WEIGHT: 205 LBS | OXYGEN SATURATION: 100 % | HEART RATE: 72 BPM | DIASTOLIC BLOOD PRESSURE: 86 MMHG | SYSTOLIC BLOOD PRESSURE: 145 MMHG | BODY MASS INDEX: 34.16 KG/M2

## 2024-06-14 PROCEDURE — 20610 DRAIN/INJ JOINT/BURSA W/O US: CPT | Mod: 50

## 2024-06-17 NOTE — PROCEDURE
[de-identified] : Euflexxa b/l knee joint injections #1 Lot: [id number] Exp: 2025-05-06 Manu: [place]  NDC: [address]-5   Procedure: Knee joint injection Laterality:  BILATERAL Indication: Osteoarthritis - discussed with patient Skin prep: alcohol and chlorhexidine Anesthesia: ethyl chloride spray Needle: 20-gauge Portal: superolateral Aspiration: ( Right knee 25 cc of nml appearing synovial fluid) Injectate: Euflexxa 1/3 Dressing: Band-aid Complications: None  - RTC in 1 week to continue bilateral Euflexxa series

## 2024-06-21 ENCOUNTER — APPOINTMENT (OUTPATIENT)
Dept: ORTHOPEDIC SURGERY | Facility: CLINIC | Age: 54
End: 2024-06-21
Payer: MEDICAID

## 2024-06-21 VITALS — HEART RATE: 75 BPM | OXYGEN SATURATION: 99 % | SYSTOLIC BLOOD PRESSURE: 135 MMHG | DIASTOLIC BLOOD PRESSURE: 82 MMHG

## 2024-06-21 PROCEDURE — 20611 DRAIN/INJ JOINT/BURSA W/US: CPT | Mod: 50

## 2024-06-23 NOTE — PROCEDURE
[de-identified] : FELISA VIERA is a 53 year old female with bilateral knee OA who presents for viscosupplementation injection of bilateral knees. No recent infection, fever or skin lesions.    Ultrasound-guided intra-articular viscosupplementation injection of right knee:   Following a discussion of the risks (bleeding, infection) and benefits, verbal consent was obtained. Patient placed in supine position. The suprapatellar recess and surrounding structures (patella, femur, quadriceps tendon, suprapatellar fat pad and prefemoral fat pad) were visualized in SAX and LAX with Sonosite 15 Hz linear transducer.   Superolateral knee was anaesthetised with ethyl chloride spray. Under strict sterile technique the right knee was prepped with chlorhexadine. Using ultrasound guidance (superolateral approach) a 20 G 1.5 inch needle was inserted into the suprapatellar recess and 2 mL of Euflexxa HA gel was injected intra-articularly without resistance.   The use of direct ultrasound visualization was necessary to increase patient safety by identifying and avoiding inadvertent needle placement within the neurovascular and osteochondral structures. Additionally, the increased accuracy of needle placement may improve therapeutic efficacy and allow higher diagnostic specificity when evaluating the effectiveness of this injection.    Ultrasound-guided intra-articular viscosupplementation injection of left knee:    The suprapatellar recess and surrounding structures (patella, femur, quadriceps tendon, suprapatellar fat pad and prefemoral fat pad) were visualized in SAX and LAX with Sonosite 15 Hz linear transducer.   Superolateral knee was anaesthetised with ethyl chloride spray. Under strict sterile technique the right knee was prepped with chlorhexadine. Using ultrasound guidance (superolateral approach) a 20 G 1.5 inch needle was inserted into the suprapatellar recess and 2 mL of Euflexxa HA gel was injected intra-articularly without resistance.   The use of direct ultrasound visualization was necessary to increase patient safety by identifying and avoiding inadvertent needle placement within the neurovascular and osteochondral structures. Additionally, the increased accuracy of needle placement may improve therapeutic efficacy and allow higher diagnostic specificity when evaluating the effectiveness of this injection.   The patient tolerated the procedures well. Post-injection instructions given (no strenuous activity for 48 hours, ice, elevate). Patient verbalized understanding. Follow up in 1 week for #3/3 injection.  EUFFLEXXA BILATERAL KNEE JOINT # 2  LOT:  [id number] EXP:  05- MAN: [employer] NDC: [address]-3

## 2024-06-28 ENCOUNTER — APPOINTMENT (OUTPATIENT)
Dept: ORTHOPEDIC SURGERY | Facility: CLINIC | Age: 54
End: 2024-06-28
Payer: MEDICAID

## 2024-06-28 VITALS — OXYGEN SATURATION: 98 % | HEART RATE: 69 BPM | SYSTOLIC BLOOD PRESSURE: 156 MMHG | DIASTOLIC BLOOD PRESSURE: 87 MMHG

## 2024-06-28 DIAGNOSIS — M17.0 BILATERAL PRIMARY OSTEOARTHRITIS OF KNEE: ICD-10-CM

## 2024-06-28 PROCEDURE — 20611 DRAIN/INJ JOINT/BURSA W/US: CPT | Mod: 50

## 2024-07-25 ENCOUNTER — APPOINTMENT (OUTPATIENT)
Dept: BREAST CENTER | Facility: CLINIC | Age: 54
End: 2024-07-25
Payer: MEDICAID

## 2024-07-25 ENCOUNTER — NON-APPOINTMENT (OUTPATIENT)
Age: 54
End: 2024-07-25

## 2024-07-25 VITALS
SYSTOLIC BLOOD PRESSURE: 137 MMHG | HEART RATE: 69 BPM | BODY MASS INDEX: 35.49 KG/M2 | DIASTOLIC BLOOD PRESSURE: 80 MMHG | WEIGHT: 213 LBS | HEIGHT: 65 IN

## 2024-07-25 DIAGNOSIS — Z80.3 FAMILY HISTORY OF MALIGNANT NEOPLASM OF BREAST: ICD-10-CM

## 2024-07-25 DIAGNOSIS — Z12.39 ENCOUNTER FOR OTHER SCREENING FOR MALIGNANT NEOPLASM OF BREAST: ICD-10-CM

## 2024-07-25 PROCEDURE — 99213 OFFICE O/P EST LOW 20 MIN: CPT

## 2024-07-25 NOTE — PHYSICAL EXAM
[de-identified] : Bilateral breast/axilla/supraclavicular area: No masses, discharge, or adenopathy\par  \par

## 2024-07-25 NOTE — PHYSICAL EXAM
[de-identified] : Bilateral breast/axilla/supraclavicular area: No masses, discharge, or adenopathy\par  \par

## 2024-07-25 NOTE — HISTORY OF PRESENT ILLNESS
[FreeTextEntry1] : Patient is a 54yo F here for breast cancer screening. Patient reports intermittent, infrequent b/l shooting breast pain. Fhx of breast cancer in maternal cousin (age 60s). Patient denies palpable masses, skin changes, or nipple discharge bilaterally.  RICARDO Lifetime Risk- 14.4%  8/26/20: B/L MG- FIbroglandular. BIRADS 1.  7/12/21: L MG & US- stable nodule, BIRADS 2 7/14/22 B/l MG- scattered fibroglandular. L stable subcentimeter LN UOQ. BI-RADS 2. 7/19/23: B/l MG- scattered fibroglandular, R stable 1.4 cm mass, R stable 12:00 0.7 cm, ALISSA. BIRADS 2. 7/25/24: B/l MG-scattered fibroglandular.  R 8:00 5 CFN 0.9 cm, previously biopsied mass interval decrease in size.  L 1:00 9 CFN 0.6 cm morphologically benign lymph node.  BI-RADS 2

## 2024-07-25 NOTE — HISTORY OF PRESENT ILLNESS
[FreeTextEntry1] : Patient is a 52yo F here for breast cancer screening. Patient reports intermittent, infrequent b/l shooting breast pain. Fhx of breast cancer in maternal cousin (age 60s). Patient denies palpable masses, skin changes, or nipple discharge bilaterally.  RICARDO Lifetime Risk- 14.4%  8/26/20: B/L MG- FIbroglandular. BIRADS 1.  7/12/21: L MG & US- stable nodule, BIRADS 2 7/14/22 B/l MG- scattered fibroglandular. L stable subcentimeter LN UOQ. BI-RADS 2. 7/19/23: B/l MG- scattered fibroglandular, R stable 1.4 cm mass, R stable 12:00 0.7 cm, ALISSA. BIRADS 2. 7/25/24: B/l MG-scattered fibroglandular.  R 8:00 5 CFN 0.9 cm, previously biopsied mass interval decrease in size.  L 1:00 9 CFN 0.6 cm morphologically benign lymph node.  BI-RADS 2

## 2024-07-25 NOTE — PHYSICAL EXAM
[de-identified] : Bilateral breast/axilla/supraclavicular area: No masses, discharge, or adenopathy\par  \par

## 2024-08-09 ENCOUNTER — OUTPATIENT (OUTPATIENT)
Dept: OUTPATIENT SERVICES | Facility: HOSPITAL | Age: 54
LOS: 1 days | End: 2024-08-09
Payer: MEDICAID

## 2024-08-09 ENCOUNTER — RESULT REVIEW (OUTPATIENT)
Age: 54
End: 2024-08-09

## 2024-08-09 ENCOUNTER — APPOINTMENT (OUTPATIENT)
Dept: ORTHOPEDIC SURGERY | Facility: CLINIC | Age: 54
End: 2024-08-09

## 2024-08-09 PROCEDURE — 20611 DRAIN/INJ JOINT/BURSA W/US: CPT | Mod: RT

## 2024-08-09 PROCEDURE — 73564 X-RAY EXAM KNEE 4 OR MORE: CPT | Mod: 26,LT,RT

## 2024-08-09 PROCEDURE — 73564 X-RAY EXAM KNEE 4 OR MORE: CPT | Mod: LT,RT

## 2024-08-09 PROCEDURE — 73564 X-RAY EXAM KNEE 4 OR MORE: CPT

## 2024-08-09 PROCEDURE — 99214 OFFICE O/P EST MOD 30 MIN: CPT | Mod: 25

## 2024-08-11 NOTE — ASSESSMENT
[FreeTextEntry1] : FELISA VIERA is a 53-year-old female with bilateral knee pain, right greater than left. I discussed with the patient that their symptoms, signs, and imaging are most consistent with osteoarthritis and right lateral tibial plateau fracture. We reviewed the natural history of this condition and treatment options. We agreed on the following plan:  X-rays taken and reviewed with the patient today. U.S. guided aspiration and CSI as detailed above. Encouraged to continue home exercises per handout. Continue physical therapy. Recommend 150 min per week of moderate intensity aerobic activity  Encouraged to continue with aqua aerobic exercises. Medication: Nabumetone PID, and Tramadol HS PRN prescriptions provided. Imaging: MRI to evaluate healing of the tibial plateau fracture. Provided information for Dr. Manning's office to inquire about orthobiologics clinical trial.  Will submit PRP for insurance authorization. Follow up after imaging. Sav

## 2024-08-11 NOTE — PHYSICAL EXAM
[de-identified] : General: Well-nourished, well-developed, alert, and in no acute distress.  Head: Normocephalic.  Eyes: Pupils equal, extraocular muscles intact, normal sclera.  Nose: No nasal discharge.  Cardiovascular: Extremities are warm and well perfused. Distal pulses are symmetric bilaterally.  Respiratory: No labored breathing.  Extremities: Sensation is intact distally bilaterally. Distal pulses are symmetric bilaterally  Lymphatic: No regional lymphadenopathy, no lymphedema Neurologic: No focal deficits  Skin: Normal skin color, texture, and turgor  Psychiatric: Normal affect  [de-identified] : Re-demonstration of curvilinear lucency of the lateral tibial plateau in the right knee consistent with impaction. Moderate to severe medial joint space narrowing with subchondral sclerosis and osteophytosis.

## 2024-08-11 NOTE — ASSESSMENT
[FreeTextEntry1] : FELISA VIERA is a 53-year-old female with bilateral knee pain, right greater than left. I discussed with the patient that their symptoms, signs, and imaging are most consistent with osteoarthritis and right lateral tibial plateau fracture. We reviewed the natural history of this condition and treatment options. We agreed on the following plan:  X-rays taken and reviewed with the patient today. U.S. guided aspiration and CSI as detailed above. Encouraged to continue home exercises per handout. Continue physical therapy. Recommend 150 min per week of moderate intensity aerobic activity  Encouraged to continue with aqua aerobic exercises. Medication: Nabumetone PID, and Tramadol HS PRN prescriptions provided. Imaging: MRI to evaluate healing of the tibial plateau fracture. Provided information for Dr. Manning's office to inquire about orthobiologics clinical trial.  Will submit PRP for insurance authorization. Follow up after imaging.

## 2024-08-11 NOTE — ADDENDUM
[FreeTextEntry1] : I, Theresa Driver ([facility]) assisted in filling out this chart under the dictation of Trinh Epperson on 08/09/2024 .

## 2024-08-11 NOTE — DISCUSSION/SUMMARY

## 2024-08-11 NOTE — ADDENDUM
[FreeTextEntry1] : I, Theresa Driver ([facility]) assisted in filling out this chart under the dictation of Trinh Epperson on 08/09/2024 .

## 2024-08-11 NOTE — END OF VISIT
[FreeTextEntry3] : Documented by Theresa Driver acting as a scribe for Trinh Epperson on 08/09/2024   All medical record entries made by the Scribe were at my, Dr. Trinh Epperson direction and personally dictated by me on 08/09/2024 . I have reviewed the chart and agree that the record accurately reflects my personal performance of the history, physical exam, assessment and plan. I have also personally directed, reviewed, and agreed with the chart. [Time Spent: ___ minutes] : I have spent [unfilled] minutes of time on the encounter.

## 2024-08-11 NOTE — DISCUSSION/SUMMARY

## 2024-08-11 NOTE — PHYSICAL EXAM
[de-identified] : General: Well-nourished, well-developed, alert, and in no acute distress.  Head: Normocephalic.  Eyes: Pupils equal, extraocular muscles intact, normal sclera.  Nose: No nasal discharge.  Cardiovascular: Extremities are warm and well perfused. Distal pulses are symmetric bilaterally.  Respiratory: No labored breathing.  Extremities: Sensation is intact distally bilaterally. Distal pulses are symmetric bilaterally  Lymphatic: No regional lymphadenopathy, no lymphedema Neurologic: No focal deficits  Skin: Normal skin color, texture, and turgor  Psychiatric: Normal affect  [de-identified] : Re-demonstration of curvilinear lucency of the lateral tibial plateau in the right knee consistent with impaction. Moderate to severe medial joint space narrowing with subchondral sclerosis and osteophytosis.

## 2024-08-11 NOTE — PROCEDURE
[de-identified] :  Ultrasound guided aspiration and intra-articular steroid injection of right knee:  Following a discussion of the risks (bleeding, infection) and benefits, verbal consent was obtained. Patient placed in supine position. The suprapatellar recess and surrounding structures (patella, femur, quadriceps tendon, suprapatellar fat pad and prefemoral fat pad) were visualized in SAX and LAX with Sonosite 15 Hz linear transducer.   Superolateral knee was anaesthetised with ethyl chloride spray. Under strict sterile technique the right knee was prepped with chlorhexadine. Using ultrasound guidance (superolateral approach) a 25 G 1.5 inch needle was inserted and after negative aspiration, 3mL of 0.5% Bupivacaine and 2mL of 1 % lidocaine was injected subcutaneously and along track into the suprapatellar recess. After adequate anaesthesia, an 18 G 1.5 inch needle was inserted into the suprapatellar recess and after aspiration of 14 mL of clear, serous fluid, 1mL Triamcinolone, 4mL 0.5% Bupivacaine and 2mL 1% lidocaine was injected intra-articularly.    The use of direct ultrasound visualization was necessary to increase patient safety by identifying and avoiding inadvertent needle placement within the neurovascular and osteochondral structures. Additionally, the increased accuracy of needle placement may improve therapeutic efficacy and allow higher diagnostic specificity when evaluating the effectiveness of this injection.  The patient tolerated the procedure well. Post-injection instructions given (no strenuous activity for 48 hours, ice, elevate). Patient verbalized understanding.

## 2024-08-11 NOTE — PROCEDURE
[de-identified] :  Ultrasound guided aspiration and intra-articular steroid injection of right knee:  Following a discussion of the risks (bleeding, infection) and benefits, verbal consent was obtained. Patient placed in supine position. The suprapatellar recess and surrounding structures (patella, femur, quadriceps tendon, suprapatellar fat pad and prefemoral fat pad) were visualized in SAX and LAX with Sonosite 15 Hz linear transducer.   Superolateral knee was anaesthetised with ethyl chloride spray. Under strict sterile technique the right knee was prepped with chlorhexadine. Using ultrasound guidance (superolateral approach) a 25 G 1.5 inch needle was inserted and after negative aspiration, 3mL of 0.5% Bupivacaine and 2mL of 1 % lidocaine was injected subcutaneously and along track into the suprapatellar recess. After adequate anaesthesia, an 18 G 1.5 inch needle was inserted into the suprapatellar recess and after aspiration of 14 mL of clear, serous fluid, 1mL Triamcinolone, 4mL 0.5% Bupivacaine and 2mL 1% lidocaine was injected intra-articularly.    The use of direct ultrasound visualization was necessary to increase patient safety by identifying and avoiding inadvertent needle placement within the neurovascular and osteochondral structures. Additionally, the increased accuracy of needle placement may improve therapeutic efficacy and allow higher diagnostic specificity when evaluating the effectiveness of this injection.  The patient tolerated the procedure well. Post-injection instructions given (no strenuous activity for 48 hours, ice, elevate). Patient verbalized understanding.

## 2024-08-16 ENCOUNTER — APPOINTMENT (OUTPATIENT)
Dept: MRI IMAGING | Facility: CLINIC | Age: 54
End: 2024-08-16
Payer: MEDICAID

## 2024-08-16 PROCEDURE — 73721 MRI JNT OF LWR EXTRE W/O DYE: CPT | Mod: RT

## 2024-08-28 ENCOUNTER — APPOINTMENT (OUTPATIENT)
Dept: ORTHOPEDIC SURGERY | Facility: CLINIC | Age: 54
End: 2024-08-28
Payer: MEDICAID

## 2024-08-28 VITALS — SYSTOLIC BLOOD PRESSURE: 115 MMHG | OXYGEN SATURATION: 100 % | DIASTOLIC BLOOD PRESSURE: 78 MMHG | HEART RATE: 86 BPM

## 2024-08-28 DIAGNOSIS — S83.519A SPRAIN OF ANTERIOR CRUCIATE LIGAMENT OF UNSPECIFIED KNEE, INITIAL ENCOUNTER: ICD-10-CM

## 2024-08-28 DIAGNOSIS — M84.469A PATHOLOGICAL FRACTURE, UNSPECIFIED TIBIA AND FIBULA, INITIAL ENCOUNTER FOR FRACTURE: ICD-10-CM

## 2024-08-28 DIAGNOSIS — M17.0 BILATERAL PRIMARY OSTEOARTHRITIS OF KNEE: ICD-10-CM

## 2024-08-28 DIAGNOSIS — S83.209A UNSPECIFIED TEAR OF UNSPECIFIED MENISCUS, CURRENT INJURY, UNSPECIFIED KNEE, INITIAL ENCOUNTER: ICD-10-CM

## 2024-08-28 PROCEDURE — 99214 OFFICE O/P EST MOD 30 MIN: CPT

## 2024-09-02 PROBLEM — M84.469A INSUFFICIENCY FRACTURE OF TIBIA: Status: ACTIVE | Noted: 2024-09-02

## 2024-09-02 PROBLEM — S83.519A ACL TEAR: Status: ACTIVE | Noted: 2024-09-02

## 2024-09-02 PROBLEM — S83.209A TEAR OF MENISCUS, CURRENT: Status: ACTIVE | Noted: 2024-09-02

## 2024-09-02 NOTE — HISTORY OF PRESENT ILLNESS
[de-identified] : FELISA VIERA is a 53-year-old female presents to follow up for Right knee pain.  Last visit was 08/09/2024 pt. had right knee CSI and was referred for MRI. MRI detailed below. Feels that CSI did not help. She is experiencing swelling. She has been attending PT and performing home exercises.

## 2024-09-02 NOTE — PHYSICAL EXAM
[de-identified] : General: Well-nourished, well-developed, alert, and in no acute distress. Head: Normocephalic. Eyes: Pupils equal, extraocular muscles intact, normal sclera. Nose: No nasal discharge. Cardiovascular: Extremities are warm and well perfused. Distal pulses are symmetric bilaterally. Respiratory: No labored breathing. Extremities: Sensation is intact distally bilaterally. Distal pulses are symmetric bilaterally Lymphatic: No regional lymphadenopathy, no lymphedema Neurologic: No focal deficits Skin: Normal skin color, texture, and turgor Psychiatric: Normal affect  MSK: Gait antalgic  Right knee ROM 10-80 with pain at end of ROM. Tender to palpation medial and lateral joint line [de-identified] : MR Knee No Cont, Right obtained at [facility] on 08/16/2024 was reviewed with the patient demonstrating:  Chronic degeneration and moderate to high-grade fraying/tearing of the anterior cruciate ligament with few residual intact fibers. Intact posterior cruciate ligament. Grade 1 acute on grade 2 chronic sprains at the femoral origin of the medial collateral ligament and meniscotibial insertion of the posterior medial corner ligaments. Peripheral extrusion of the body of medial meniscus with free edge fraying/tearing of the body and posterior horn. Large chronic full-thickness defect involving the majority of the weightbearing medial femoral condyle and tibial plateau with scattered small subjacent subchondral fibrocystic changes. Lateral meniscus is intact. Moderate chondrosis including ill-defined small full-thickness defect along the posterior central weightbearing lateral femoral condyle. Moderate chondrosis including ill-defined small full-thickness defect near the center of the patellar apex.  Mild residual marrow edema like signal surrounds a subacute nondisplaced, mildly depressed subchondral insufficiency fracture along the lateral tibial plateau. Moderate to large medial femorotibial compartment predominant osteophyte formation.

## 2024-09-02 NOTE — END OF VISIT
[Time Spent: ___ minutes] : I have spent [unfilled] minutes of time on the encounter which excludes teaching and separately reported services. [FreeTextEntry3] : All medical record entries made by the Camilla Wright ([place]) were at my, Dr.Leslie Epperson, direction and personally dictated by me on 08/28/2024. I have reviewed the chart and agree that the record accurately reflects my personal performance of the history, physical exam, assessment, and plan. I have also personally directed, reviewed, and agreed with the chart.

## 2024-09-02 NOTE — ASSESSMENT
[FreeTextEntry1] : FELISA VIERA is a 53 year old female with b/l knee pain, right > left. I discussed with the patient that their symptoms, signs, and imaging are most consistent with  OA and tibial insufficiency fracture of the right lateral tibial plateau.  We reviewed the natural history of this condition and treatment options. We agreed on the following plan:  MRI reviewed with patient today. Discussed possible HA gel or PRP injection. Pt. will follow up with Dr. Manning to be considered for a biologic study. Provided with contact information for Dr. Kc to discuss the possibility TKA. Encouraged to continue home exercises as per the handout. Continue physical therapy. Follow up in 6 weeks.

## 2024-09-02 NOTE — DISCUSSION/SUMMARY

## 2024-09-02 NOTE — DISCUSSION/SUMMARY

## 2024-09-02 NOTE — HISTORY OF PRESENT ILLNESS
[de-identified] : FELISA VIERA is a 53-year-old female presents to follow up for Right knee pain.  Last visit was 08/09/2024 pt. had right knee CSI and was referred for MRI. MRI detailed below. Feels that CSI did not help. She is experiencing swelling. She has been attending PT and performing home exercises.

## 2024-09-02 NOTE — PHYSICAL EXAM
[de-identified] : General: Well-nourished, well-developed, alert, and in no acute distress. Head: Normocephalic. Eyes: Pupils equal, extraocular muscles intact, normal sclera. Nose: No nasal discharge. Cardiovascular: Extremities are warm and well perfused. Distal pulses are symmetric bilaterally. Respiratory: No labored breathing. Extremities: Sensation is intact distally bilaterally. Distal pulses are symmetric bilaterally Lymphatic: No regional lymphadenopathy, no lymphedema Neurologic: No focal deficits Skin: Normal skin color, texture, and turgor Psychiatric: Normal affect  MSK: Gait antalgic  Right knee ROM 10-80 with pain at end of ROM. Tender to palpation medial and lateral joint line [de-identified] : MR Knee No Cont, Right obtained at [facility] on 08/16/2024 was reviewed with the patient demonstrating:  Chronic degeneration and moderate to high-grade fraying/tearing of the anterior cruciate ligament with few residual intact fibers. Intact posterior cruciate ligament. Grade 1 acute on grade 2 chronic sprains at the femoral origin of the medial collateral ligament and meniscotibial insertion of the posterior medial corner ligaments. Peripheral extrusion of the body of medial meniscus with free edge fraying/tearing of the body and posterior horn. Large chronic full-thickness defect involving the majority of the weightbearing medial femoral condyle and tibial plateau with scattered small subjacent subchondral fibrocystic changes. Lateral meniscus is intact. Moderate chondrosis including ill-defined small full-thickness defect along the posterior central weightbearing lateral femoral condyle. Moderate chondrosis including ill-defined small full-thickness defect near the center of the patellar apex.  Mild residual marrow edema like signal surrounds a subacute nondisplaced, mildly depressed subchondral insufficiency fracture along the lateral tibial plateau. Moderate to large medial femorotibial compartment predominant osteophyte formation.

## 2024-09-02 NOTE — HISTORY OF PRESENT ILLNESS
[de-identified] : FELISA VIERA is a 53-year-old female presents to follow up for Right knee pain.  Last visit was 08/09/2024 pt. had right knee CSI and was referred for MRI. MRI detailed below. Feels that CSI did not help. She is experiencing swelling. She has been attending PT and performing home exercises.

## 2024-09-02 NOTE — ADDENDUM
[FreeTextEntry1] : Documented by Camilla Wright acting as a scribe for Dr. Trinh Epperson 08/28/2024.

## 2024-09-02 NOTE — PHYSICAL EXAM
[de-identified] : General: Well-nourished, well-developed, alert, and in no acute distress. Head: Normocephalic. Eyes: Pupils equal, extraocular muscles intact, normal sclera. Nose: No nasal discharge. Cardiovascular: Extremities are warm and well perfused. Distal pulses are symmetric bilaterally. Respiratory: No labored breathing. Extremities: Sensation is intact distally bilaterally. Distal pulses are symmetric bilaterally Lymphatic: No regional lymphadenopathy, no lymphedema Neurologic: No focal deficits Skin: Normal skin color, texture, and turgor Psychiatric: Normal affect  MSK: Gait antalgic  Right knee ROM 10-80 with pain at end of ROM. Tender to palpation medial and lateral joint line [de-identified] : MR Knee No Cont, Right obtained at [facility] on 08/16/2024 was reviewed with the patient demonstrating:  Chronic degeneration and moderate to high-grade fraying/tearing of the anterior cruciate ligament with few residual intact fibers. Intact posterior cruciate ligament. Grade 1 acute on grade 2 chronic sprains at the femoral origin of the medial collateral ligament and meniscotibial insertion of the posterior medial corner ligaments. Peripheral extrusion of the body of medial meniscus with free edge fraying/tearing of the body and posterior horn. Large chronic full-thickness defect involving the majority of the weightbearing medial femoral condyle and tibial plateau with scattered small subjacent subchondral fibrocystic changes. Lateral meniscus is intact. Moderate chondrosis including ill-defined small full-thickness defect along the posterior central weightbearing lateral femoral condyle. Moderate chondrosis including ill-defined small full-thickness defect near the center of the patellar apex.  Mild residual marrow edema like signal surrounds a subacute nondisplaced, mildly depressed subchondral insufficiency fracture along the lateral tibial plateau. Moderate to large medial femorotibial compartment predominant osteophyte formation.

## 2024-09-02 NOTE — DISCUSSION/SUMMARY

## 2024-09-11 ENCOUNTER — APPOINTMENT (OUTPATIENT)
Dept: ORTHOPEDIC SURGERY | Facility: CLINIC | Age: 54
End: 2024-09-11
Payer: MEDICAID

## 2024-09-11 VITALS — BODY MASS INDEX: 35.49 KG/M2 | HEIGHT: 65 IN | WEIGHT: 213 LBS

## 2024-09-11 DIAGNOSIS — M24.561 CONTRACTURE, RIGHT KNEE: ICD-10-CM

## 2024-09-11 DIAGNOSIS — M84.469D PATHOLOGICAL FRACTURE, UNSPECIFIED TIBIA AND FIBULA, SUBSEQUENT ENCOUNTER FOR FRACTURE WITH ROUTINE HEALING: ICD-10-CM

## 2024-09-11 PROCEDURE — 99204 OFFICE O/P NEW MOD 45 MIN: CPT

## 2024-09-11 NOTE — HISTORY OF PRESENT ILLNESS
[de-identified] : Daysi is a very nice 53 year old female sent by Dr Epperson for discussion of Orthobiologic options for her right knee DJD.  She had an arthroscopic partial menisectomy in 2022 and since then has not been able to fully extend the knee past 10 degrees.  She had some pain as well but this was exacerbated significantly last spring when [place].  SHe supposedly sustained a subchondral depressed stress fx but that was month ago now and she has no lateral joint pain.  X-rays CT and MRI are available which show clear KL3-4 medial joint varus OA.

## 2024-09-11 NOTE — DISCUSSION/SUMMARY
[de-identified] : I have told Daysi that she will eventually need TKA and that that would be my recommendation now too however at thsi young age she would like to avoid or delay that.  We discussed more aggressive PT to kailash with the contracture and combine that with PRP injection.  We had a long discussion today about the risks and benefits of various Orthobiologic injection procedures. These included PRP, Amniotic Allograft product, Lipogems/lipoaspirate injections and BMAC. The fact that these treatments are investigational and not approved by any insurance product was also discussed  SHe would like to proceed with the PRP as well as the aggressive therapy discussed and prescribed.  WIll move forward with PRP in October.  Ultimately follow-up down the road with Dr Kc would be appropriate to consider Arthroplasty options.  (thank you note to Dr Epperson with copy of this note. )

## 2024-09-11 NOTE — PHYSICAL EXAM
[de-identified] : Right knee ROM 15 to 95 degrees. no joint line tenderness grade 1 Lachman  neg Pivot shift.   [de-identified] : CT scan, MRI and x-rays reviewed today show varus DJD knee with KL 4 changes medial joint and a healed subchondral lateral tibial stress fx. Also high grade partial ACL tear.

## 2024-09-13 ENCOUNTER — APPOINTMENT (OUTPATIENT)
Dept: ORTHOPEDIC SURGERY | Facility: CLINIC | Age: 54
End: 2024-09-13
Payer: MEDICAID

## 2024-09-13 VITALS
WEIGHT: 213 LBS | DIASTOLIC BLOOD PRESSURE: 83 MMHG | HEIGHT: 65 IN | SYSTOLIC BLOOD PRESSURE: 124 MMHG | OXYGEN SATURATION: 99 % | BODY MASS INDEX: 35.49 KG/M2 | HEART RATE: 68 BPM

## 2024-09-13 DIAGNOSIS — M17.0 BILATERAL PRIMARY OSTEOARTHRITIS OF KNEE: ICD-10-CM

## 2024-09-13 PROCEDURE — 99214 OFFICE O/P EST MOD 30 MIN: CPT

## 2024-09-13 NOTE — PHYSICAL EXAM
[de-identified] :   Gait: She walks with an normal/antalgic gait  Inspection: Knee appears unremarkable/with mild effusion Well healed surgical scars from prior meniscal repair No ulcerations observed  Palpation: Tenderness to palpation of the medial and patellar joint line  Range of Motion:         Right: 10 - 100                  Left: 5 - 110  Painful ROM bilaterally. bilateral knee stable to varus/valgus and ant/post stress  Both hips are stable no sign of dislocation or subluxation on exam with good pain free ROM.  - [facility]  [de-identified] :  I have reviewed X-rays of the bilateral knee which include (4 nico views). They reveal severe degenerative arthritis of the medial and patellar joint spaces with joint space narrowing and osteophyte formation, subchondral sclerosis.

## 2024-09-13 NOTE — DISCUSSION/SUMMARY
[de-identified] : I was present with the Fellow during the key portions of the history and exam. I discussed the case with the Fellow and agree with the findings and plan as documented in the [facility] note, unless otherwise noted below.   Bilateral knee arthritis currently more symptomatic on the right.  The Arthritis is severe.  I had a long discussion with the patient regarding knee arthritis / degenerative disease and treatment options. We reviewed the nature and etiology of degenerative knee degenerative disease.  We discussed the spectrum of symptomatic treatments. Our discussion included the use of appropriate exercises, activity modifications, weight reduction and maintenance, appropriate medication use, use of assistive devices, role of injections and avoidance of high impact activities.  Given the clinical symptoms, physical exam and imaging findings, we discussed the possibility of knee replacement surgery.  We reviewed the elective quality of life nature of the procedure and the details of the surgery, approach and the implants used, using the model  in the clinic. This included discussion of the material and fixation options. We also discussed the risks, benefits and expected and potential outcomes at length.  The details of those risks are below.  Category 1 includes risks that could occur in association with any operation. They include heart attack, stroke, blood clot and pulmonary embolism, wound infection, transfusion reaction, drug allergy, and complications related to anesthesia. This list is not intended to be complete but only to convey a broad range of general medical risks to be aware of.  Blood clots may lead to a block in circulation. A blood clot that completely blocks a large artery can lead to gangrene. If this happens an amputation may be required. Blood clots in leg veins lead to pain and swelling. If part of the clot breaks off it can travel to the brain and lead to a stroke. A clot can also travel to the lung, resulting in a pulmonary embolism. Medication after surgery will minimize but not eliminate these complications.  Category 2 is a list of risks and complications specifically related to total or partial joint replacement. This list is not complete but is intended to make the patient aware of the kinds of implant-related risks and complications that might occur.    1. If the device gets loose or wears out further surgery may be required to revise the prosthesis. 2. If an infection develops, further surgery to washout the joint, remove or replace parts, or insert an antibiotic spacer may be required 3. Muscle, Tendon, Nerve and blood vessel damage may result as a consequence of mobilization of the joint or dissection near these structures. These injuries can lead to weakness, numbness and paralysis. The damage may be temporary or permanent. The recovery process can be long and may require further procedures.   4. Dislocations and instability may also require further surgery.   5. Periprosthetic fracture requiring revision surgery. 6. Leg length discrepancy  7. Stiffness 8. Wound complications requiring either local wound care, revision surgery, or plastic surgery consultation  9. Chronic pain requiring pain management   She feels she would like to proceed most likely but wants to consider further which is certainly appropriate.  We did undergo an extensive shared decision-making process regarding that procedure risks and recovery expected outcomes.  I would plan a home discharge we would address the right side which is the most symptomatic at this time.  She is can work on the nonoperative therapies we discussed above and will consider and will contact us if she decides she would like to proceed.  All questions answered.

## 2024-09-13 NOTE — HISTORY OF PRESENT ILLNESS
[de-identified] :  FELISA VIERA is a pleasant 53 year female . FELISA VIERA complains of bilateral knee R>L  pain over the past several years. Pain is located in the medial and patellar region of the knee and does not radiate. She has a history of bilateral meniscal repairs. She notes the pain is worse with activity- walking/taking stairs/getting in out of car and rates it 9 out of 10 at its worst. She can walk around 5 blocks before having to stop due to the pain. She is having difficulty taking stairs and putting on their shoes and socks. She denies mechanical symptoms including catching, locking, buckling.  She does have rest/nighttime pain. She  reports difficulty taking public transportation due to the excessive stairs. She has been treated non-surgically with ice/heat, physical therapy, anti-inflammatory medications (Advil), weight loss, activity modification, and intra-articular injection (steroid and HA) (last was in August with) which lasted for a few weeks but was not durable. The left/right knee pain significantly interferes with their ADLs and quality of life. Denies any fevers and chills.

## 2024-09-13 NOTE — CONSULT LETTER
[FreeTextEntry2] : Dr. Rose [FreeTextEntry1] : I had the privilege of evaluating your patient today. I have enclosed my office note for your reference. If you have any questions, concerns or further input, please do not hesitate to contact me.  Thank you for allowing me to participate in the care of your patient.  Sincerely, Bernard Kc MD

## 2024-10-08 ENCOUNTER — APPOINTMENT (OUTPATIENT)
Dept: ORTHOPEDIC SURGERY | Facility: CLINIC | Age: 54
End: 2024-10-08
Payer: MEDICAID

## 2024-10-08 PROCEDURE — 0232T NJX PLATELET PLASMA: CPT

## 2024-10-08 PROCEDURE — 005KIT: CUSTOM

## 2024-10-09 ENCOUNTER — APPOINTMENT (OUTPATIENT)
Dept: ORTHOPEDIC SURGERY | Facility: CLINIC | Age: 54
End: 2024-10-09

## 2024-11-26 ENCOUNTER — APPOINTMENT (OUTPATIENT)
Dept: ORTHOPEDIC SURGERY | Facility: CLINIC | Age: 54
End: 2024-11-26

## 2025-02-03 ENCOUNTER — APPOINTMENT (OUTPATIENT)
Dept: ORTHOPEDIC SURGERY | Facility: CLINIC | Age: 55
End: 2025-02-03
Payer: MEDICAID

## 2025-02-03 VITALS — DIASTOLIC BLOOD PRESSURE: 86 MMHG | SYSTOLIC BLOOD PRESSURE: 132 MMHG | OXYGEN SATURATION: 98 % | HEART RATE: 78 BPM

## 2025-02-03 DIAGNOSIS — M72.2 PLANTAR FASCIAL FIBROMATOSIS: ICD-10-CM

## 2025-02-03 DIAGNOSIS — M17.0 BILATERAL PRIMARY OSTEOARTHRITIS OF KNEE: ICD-10-CM

## 2025-02-03 PROCEDURE — 99214 OFFICE O/P EST MOD 30 MIN: CPT | Mod: 25

## 2025-02-03 PROCEDURE — 76942 ECHO GUIDE FOR BIOPSY: CPT | Mod: RT

## 2025-02-03 PROCEDURE — 20550 NJX 1 TENDON SHEATH/LIGAMENT: CPT | Mod: RT

## 2025-05-13 NOTE — ED ADULT NURSE NOTE - CINV DISCH TEACH PARTICIP
Quality 226: Preventive Care And Screening: Tobacco Use: Screening And Cessation Intervention: Patient screened for tobacco use and is an ex/non-smoker Detail Level: Detailed Quality 130: Documentation Of Current Medications In The Medical Record: Current Medications Documented Patient

## 2025-06-10 ENCOUNTER — APPOINTMENT (OUTPATIENT)
Dept: ORTHOPEDIC SURGERY | Age: 55
End: 2025-06-10
Payer: MEDICAID

## 2025-06-10 VITALS
DIASTOLIC BLOOD PRESSURE: 77 MMHG | BODY MASS INDEX: 39.65 KG/M2 | OXYGEN SATURATION: 99 % | WEIGHT: 238 LBS | SYSTOLIC BLOOD PRESSURE: 134 MMHG | HEART RATE: 75 BPM | HEIGHT: 65 IN

## 2025-06-10 PROCEDURE — 20611 DRAIN/INJ JOINT/BURSA W/US: CPT | Mod: 50

## 2025-06-10 PROCEDURE — 99214 OFFICE O/P EST MOD 30 MIN: CPT | Mod: 25

## 2025-06-10 RX ORDER — HYALURONATE SODIUM 20 MG/2 ML
20 SYRINGE (ML) INTRAARTICULAR
Qty: 2 | Refills: 0 | Status: ACTIVE | COMMUNITY
Start: 2025-06-10

## 2025-07-09 ENCOUNTER — APPOINTMENT (OUTPATIENT)
Dept: ORTHOPEDIC SURGERY | Facility: CLINIC | Age: 55
End: 2025-07-09
Payer: MEDICAID

## 2025-07-09 VITALS
OXYGEN SATURATION: 98 % | SYSTOLIC BLOOD PRESSURE: 128 MMHG | WEIGHT: 238 LBS | HEART RATE: 75 BPM | HEIGHT: 65 IN | BODY MASS INDEX: 39.65 KG/M2 | DIASTOLIC BLOOD PRESSURE: 79 MMHG | RESPIRATION RATE: 16 BRPM

## 2025-07-09 PROCEDURE — 20611 DRAIN/INJ JOINT/BURSA W/US: CPT | Mod: 50

## 2025-07-16 ENCOUNTER — APPOINTMENT (OUTPATIENT)
Dept: ORTHOPEDIC SURGERY | Facility: CLINIC | Age: 55
End: 2025-07-16
Payer: MEDICAID

## 2025-07-16 VITALS — SYSTOLIC BLOOD PRESSURE: 118 MMHG | DIASTOLIC BLOOD PRESSURE: 78 MMHG | OXYGEN SATURATION: 97 % | HEART RATE: 77 BPM

## 2025-07-16 PROCEDURE — 20611 DRAIN/INJ JOINT/BURSA W/US: CPT | Mod: 50

## 2025-07-18 ENCOUNTER — APPOINTMENT (OUTPATIENT)
Dept: ORTHOPEDIC SURGERY | Age: 55
End: 2025-07-18
Payer: MEDICAID

## 2025-07-18 ENCOUNTER — APPOINTMENT (OUTPATIENT)
Dept: ORTHOPEDIC SURGERY | Facility: CLINIC | Age: 55
End: 2025-07-18

## 2025-07-18 PROBLEM — M65.341 TRIGGER RING FINGER OF RIGHT HAND: Status: ACTIVE | Noted: 2025-07-09

## 2025-07-18 PROCEDURE — 20550 NJX 1 TENDON SHEATH/LIGAMENT: CPT

## 2025-07-18 PROCEDURE — 99204 OFFICE O/P NEW MOD 45 MIN: CPT | Mod: 25

## 2025-07-23 ENCOUNTER — APPOINTMENT (OUTPATIENT)
Dept: ORTHOPEDIC SURGERY | Facility: CLINIC | Age: 55
End: 2025-07-23
Payer: MEDICAID

## 2025-07-23 VITALS
HEART RATE: 79 BPM | DIASTOLIC BLOOD PRESSURE: 81 MMHG | HEIGHT: 65 IN | TEMPERATURE: 98.2 F | OXYGEN SATURATION: 99 % | BODY MASS INDEX: 39.65 KG/M2 | SYSTOLIC BLOOD PRESSURE: 138 MMHG | WEIGHT: 238 LBS

## 2025-07-23 DIAGNOSIS — M17.0 BILATERAL PRIMARY OSTEOARTHRITIS OF KNEE: ICD-10-CM

## 2025-07-23 PROCEDURE — 20610 DRAIN/INJ JOINT/BURSA W/O US: CPT | Mod: 50

## 2025-08-04 ENCOUNTER — APPOINTMENT (OUTPATIENT)
Dept: BREAST CENTER | Facility: CLINIC | Age: 55
End: 2025-08-04
Payer: MEDICAID

## 2025-08-04 VITALS
HEIGHT: 65 IN | DIASTOLIC BLOOD PRESSURE: 86 MMHG | BODY MASS INDEX: 39.65 KG/M2 | WEIGHT: 238 LBS | SYSTOLIC BLOOD PRESSURE: 139 MMHG | HEART RATE: 73 BPM

## 2025-08-04 DIAGNOSIS — Z12.39 ENCOUNTER FOR OTHER SCREENING FOR MALIGNANT NEOPLASM OF BREAST: ICD-10-CM

## 2025-08-04 PROCEDURE — 99213 OFFICE O/P EST LOW 20 MIN: CPT

## 2025-08-04 RX ORDER — MULTIVITAMIN
CAPSULE ORAL
Refills: 0 | Status: ACTIVE | COMMUNITY

## 2025-08-05 ENCOUNTER — APPOINTMENT (OUTPATIENT)
Dept: ORTHOPEDIC SURGERY | Age: 55
End: 2025-08-05
Payer: MEDICAID

## 2025-08-05 VITALS
BODY MASS INDEX: 39.65 KG/M2 | DIASTOLIC BLOOD PRESSURE: 86 MMHG | HEIGHT: 65 IN | HEART RATE: 73 BPM | WEIGHT: 238 LBS | OXYGEN SATURATION: 98 % | TEMPERATURE: 96 F | SYSTOLIC BLOOD PRESSURE: 148 MMHG

## 2025-08-05 DIAGNOSIS — M72.2 PLANTAR FASCIAL FIBROMATOSIS: ICD-10-CM

## 2025-08-05 PROCEDURE — 73630 X-RAY EXAM OF FOOT: CPT | Mod: RT

## 2025-08-05 PROCEDURE — 20550 NJX 1 TENDON SHEATH/LIGAMENT: CPT | Mod: RT

## 2025-08-05 PROCEDURE — 99214 OFFICE O/P EST MOD 30 MIN: CPT | Mod: 25

## 2025-08-05 PROCEDURE — 76942 ECHO GUIDE FOR BIOPSY: CPT | Mod: RT

## 2025-08-25 ENCOUNTER — APPOINTMENT (OUTPATIENT)
Dept: ORTHOPEDIC SURGERY | Age: 55
End: 2025-08-25